# Patient Record
Sex: FEMALE
[De-identification: names, ages, dates, MRNs, and addresses within clinical notes are randomized per-mention and may not be internally consistent; named-entity substitution may affect disease eponyms.]

---

## 2018-04-11 ENCOUNTER — RX ONLY (RX ONLY)
Age: 23
End: 2018-04-11

## 2018-04-11 ENCOUNTER — SPOT (OUTPATIENT)
Dept: URBAN - METROPOLITAN AREA CLINIC 19 | Facility: CLINIC | Age: 23
Setting detail: DERMATOLOGY
End: 2018-04-11

## 2018-04-11 DIAGNOSIS — L82.1 OTHER SEBORRHEIC KERATOSIS: ICD-10-CM

## 2018-04-11 DIAGNOSIS — L81.4 OTHER MELANIN HYPERPIGMENTATION: ICD-10-CM

## 2018-04-11 DIAGNOSIS — L57.8 OTHER SKIN CHANGES DUE TO CHRONIC EXPOSURE TO NONIONIZING RADIATION: ICD-10-CM

## 2018-04-11 DIAGNOSIS — D22.5 MELANOCYTIC NEVI OF TRUNK: ICD-10-CM

## 2018-04-11 DIAGNOSIS — D18.01 HEMANGIOMA OF SKIN AND SUBCUTANEOUS TISSUE: ICD-10-CM

## 2018-04-11 DIAGNOSIS — L85.3 XEROSIS CUTIS: ICD-10-CM

## 2018-04-11 PROBLEM — B36.0 PITYRIASIS VERSICOLOR: Status: RESOLVED | Noted: 2018-04-11

## 2018-04-11 PROCEDURE — 99212 OFFICE O/P EST SF 10 MIN: CPT

## 2018-04-11 RX ORDER — TERBINAFINE HYDROCHLORIDE 250 MG/1
1 TABLET TABLET ORAL DAILY
Qty: 14 | Refills: 1 | Status: DISCONTINUED
Start: 2018-04-11 | End: 2018-06-07

## 2018-04-11 RX ORDER — LULICONAZOLE 10 MG/G
1 APPLICATION CREAM TOPICAL BID
Qty: 60 | Refills: 3 | Status: DISCONTINUED
Start: 2018-04-11 | End: 2018-06-07

## 2018-04-11 RX ORDER — KETOCONAZOLE 20 MG/ML
1 APPLICATION SHAMPOO, SUSPENSION TOPICAL DAILY
Qty: 120 | Refills: 3 | Status: DISCONTINUED
Start: 2018-04-11 | End: 2018-06-07

## 2018-06-07 ENCOUNTER — RX ONLY (RX ONLY)
Age: 23
End: 2018-06-07

## 2018-06-07 RX ORDER — LULICONAZOLE 10 MG/G
1 APPLICATION CREAM TOPICAL BID
Qty: 60 | Refills: 3
Start: 2018-06-07

## 2018-06-07 RX ORDER — KETOCONAZOLE 20 MG/ML
1 APPLICATION SHAMPOO, SUSPENSION TOPICAL DAILY
Qty: 120 | Refills: 3
Start: 2018-06-07

## 2018-06-07 RX ORDER — TERBINAFINE HYDROCHLORIDE 250 MG/1
1 TABLET TABLET ORAL DAILY
Qty: 14 | Refills: 1
Start: 2018-06-07

## 2019-01-16 ENCOUNTER — RX ONLY (RX ONLY)
Age: 24
End: 2019-01-16

## 2019-01-16 RX ORDER — KETOCONAZOLE 20 MG/ML
1 APPLICATION SHAMPOO, SUSPENSION TOPICAL DAILY
Qty: 120 | Refills: 1 | Status: DISCONTINUED
Start: 2019-01-16 | End: 2019-06-18

## 2019-01-16 RX ORDER — LULICONAZOLE 10 MG/G
1 APPLICATION CREAM TOPICAL BID
Qty: 60 | Refills: 1
Start: 2019-01-16

## 2019-01-22 ENCOUNTER — RX ONLY (RX ONLY)
Age: 24
End: 2019-01-22

## 2019-01-22 ENCOUNTER — FOLLOW-UP (OUTPATIENT)
Dept: URBAN - METROPOLITAN AREA CLINIC 21 | Facility: CLINIC | Age: 24
Setting detail: DERMATOLOGY
End: 2019-01-22

## 2019-01-22 DIAGNOSIS — D48.5 NEOPLASM OF UNCERTAIN BEHAVIOR OF SKIN: ICD-10-CM

## 2019-01-22 PROBLEM — B36.0 PITYRIASIS VERSICOLOR: Status: RESOLVED | Noted: 2019-01-22

## 2019-01-22 PROCEDURE — 99212 OFFICE O/P EST SF 10 MIN: CPT

## 2019-01-22 RX ORDER — FLUCONAZOLE 100 MG/1
1 TABLET TABLET ORAL DAILY
Qty: 3 | Refills: 1 | Status: DISCONTINUED
Start: 2019-01-22 | End: 2019-06-18

## 2019-06-18 ENCOUNTER — RX ONLY (RX ONLY)
Age: 24
End: 2019-06-18

## 2019-06-18 RX ORDER — KETOCONAZOLE 20 MG/ML
1 APPLICATION SHAMPOO, SUSPENSION TOPICAL DAILY
Qty: 120 | Refills: 1
Start: 2019-06-18

## 2019-06-18 RX ORDER — FLUCONAZOLE 100 MG/1
1 TABLET TABLET ORAL DAILY
Qty: 3 | Refills: 1
Start: 2019-06-18

## 2021-12-15 ENCOUNTER — TELEPHONE (OUTPATIENT)
Dept: PSYCHOLOGY | Age: 26
End: 2021-12-15

## 2021-12-15 NOTE — TELEPHONE ENCOUNTER
YOUNG [place] referring provider called in. She asked if his provider might call her at [phone] so that she can provide further needed information and insight prior to pt's appt on 12/22.

## 2021-12-29 NOTE — TELEPHONE ENCOUNTER
Provider had attempted to contact the referring provider Lucillie Merlin) on the original patient's appt; but no return call. Patient is scheduled again for today on 12/29/21; provider will attempt to contact referring provider again; if no return call. Provider will be seeing the patient.

## 2022-01-04 ENCOUNTER — TELEPHONE (OUTPATIENT)
Dept: PSYCHOLOGY | Age: 27
End: 2022-01-04

## 2022-01-04 NOTE — TELEPHONE ENCOUNTER
Pt called in today to see about getting sched with the new provider Nadine Dutton.  Referral is out to provider